# Patient Record
Sex: FEMALE | Race: WHITE | NOT HISPANIC OR LATINO | Employment: OTHER | URBAN - METROPOLITAN AREA
[De-identification: names, ages, dates, MRNs, and addresses within clinical notes are randomized per-mention and may not be internally consistent; named-entity substitution may affect disease eponyms.]

---

## 2019-01-01 ENCOUNTER — OFFICE VISIT (OUTPATIENT)
Dept: FAMILY MEDICINE CLINIC | Facility: CLINIC | Age: 84
End: 2019-01-01

## 2019-01-01 VITALS
HEART RATE: 61 BPM | HEIGHT: 65 IN | SYSTOLIC BLOOD PRESSURE: 135 MMHG | DIASTOLIC BLOOD PRESSURE: 85 MMHG | OXYGEN SATURATION: 97 % | TEMPERATURE: 97.4 F | WEIGHT: 141.8 LBS | BODY MASS INDEX: 23.63 KG/M2

## 2019-01-01 VITALS
WEIGHT: 142 LBS | DIASTOLIC BLOOD PRESSURE: 68 MMHG | HEART RATE: 68 BPM | OXYGEN SATURATION: 98 % | TEMPERATURE: 98.2 F | HEIGHT: 66 IN | BODY MASS INDEX: 22.82 KG/M2 | SYSTOLIC BLOOD PRESSURE: 126 MMHG

## 2019-01-01 DIAGNOSIS — F07.81 POST CONCUSSIVE SYNDROME: Primary | ICD-10-CM

## 2019-01-01 DIAGNOSIS — I10 ESSENTIAL HYPERTENSION: ICD-10-CM

## 2019-01-01 DIAGNOSIS — K21.9 GASTROESOPHAGEAL REFLUX DISEASE WITHOUT ESOPHAGITIS: ICD-10-CM

## 2019-01-01 DIAGNOSIS — E78.2 MIXED HYPERLIPIDEMIA: ICD-10-CM

## 2019-01-01 DIAGNOSIS — M54.17 LUMBOSACRAL RADICULOPATHY: ICD-10-CM

## 2019-01-01 DIAGNOSIS — R26.2 DIFFICULTY WALKING: ICD-10-CM

## 2019-01-01 DIAGNOSIS — I10 ESSENTIAL HYPERTENSION: Primary | ICD-10-CM

## 2019-01-01 DIAGNOSIS — R41.3 MEMORY IMPAIRMENT: ICD-10-CM

## 2019-01-01 PROCEDURE — 99214 OFFICE O/P EST MOD 30 MIN: CPT | Performed by: FAMILY MEDICINE

## 2019-01-01 RX ORDER — AMLODIPINE BESYLATE 2.5 MG/1
2.5 TABLET ORAL DAILY
COMMUNITY
End: 2019-01-01 | Stop reason: SDUPTHER

## 2019-01-01 RX ORDER — ATORVASTATIN CALCIUM 80 MG/1
80 TABLET, FILM COATED ORAL DAILY
Qty: 90 TABLET | Refills: 3 | Status: SHIPPED | OUTPATIENT
Start: 2019-01-01

## 2019-01-01 RX ORDER — ISOSORBIDE MONONITRATE 60 MG/1
60 TABLET, EXTENDED RELEASE ORAL DAILY
Qty: 90 TABLET | Refills: 3 | Status: SHIPPED | OUTPATIENT
Start: 2019-01-01 | End: 2020-01-01 | Stop reason: SDUPTHER

## 2019-01-01 RX ORDER — MIRTAZAPINE 7.5 MG/1
7.5 TABLET, FILM COATED ORAL
Qty: 90 TABLET | Refills: 3 | Status: SHIPPED | OUTPATIENT
Start: 2019-01-01 | End: 2020-01-01 | Stop reason: SDUPTHER

## 2019-01-01 RX ORDER — ESCITALOPRAM OXALATE 10 MG/1
15 TABLET ORAL DAILY
Qty: 135 TABLET | Refills: 2 | Status: SHIPPED | OUTPATIENT
Start: 2019-01-01 | End: 2020-01-01 | Stop reason: SDUPTHER

## 2019-01-01 RX ORDER — TICAGRELOR 90 MG/1
90 TABLET ORAL 2 TIMES DAILY
Qty: 180 TABLET | Refills: 1 | Status: SHIPPED | OUTPATIENT
Start: 2019-01-01 | End: 2020-01-01

## 2019-01-01 RX ORDER — AMLODIPINE BESYLATE 2.5 MG/1
2.5 TABLET ORAL DAILY
Qty: 90 TABLET | Refills: 3 | Status: SHIPPED | OUTPATIENT
Start: 2019-01-01

## 2019-01-01 RX ORDER — NYSTATIN 100000 U/G
OINTMENT TOPICAL
COMMUNITY

## 2019-01-01 RX ORDER — LOSARTAN POTASSIUM 25 MG/1
25 TABLET ORAL 2 TIMES DAILY
Qty: 180 TABLET | Refills: 3 | Status: SHIPPED | OUTPATIENT
Start: 2019-01-01 | End: 2020-01-01 | Stop reason: SDUPTHER

## 2019-01-01 RX ORDER — ESCITALOPRAM OXALATE 10 MG/1
15 TABLET ORAL DAILY
Qty: 45 TABLET | Refills: 4 | Status: SHIPPED | OUTPATIENT
Start: 2019-01-01 | End: 2019-01-01 | Stop reason: SDUPTHER

## 2019-01-01 RX ORDER — ZOLPIDEM TARTRATE 10 MG/1
TABLET ORAL
COMMUNITY
End: 2020-01-01 | Stop reason: SDUPTHER

## 2019-01-01 RX ORDER — ISOSORBIDE MONONITRATE 60 MG/1
60 TABLET, EXTENDED RELEASE ORAL DAILY
Refills: 0 | COMMUNITY
Start: 2019-08-02 | End: 2019-01-01 | Stop reason: SDUPTHER

## 2019-01-01 RX ORDER — MIRTAZAPINE 7.5 MG/1
7.5 TABLET, FILM COATED ORAL
Refills: 5 | COMMUNITY
Start: 2019-01-01 | End: 2019-01-01 | Stop reason: SDUPTHER

## 2019-01-01 RX ORDER — CARVEDILOL 3.12 MG/1
3.12 TABLET ORAL 2 TIMES DAILY
Qty: 180 TABLET | Refills: 3 | Status: SHIPPED | OUTPATIENT
Start: 2019-01-01

## 2019-08-01 ENCOUNTER — OFFICE VISIT (OUTPATIENT)
Dept: FAMILY MEDICINE CLINIC | Facility: CLINIC | Age: 84
End: 2019-08-01

## 2019-08-01 VITALS
WEIGHT: 139 LBS | SYSTOLIC BLOOD PRESSURE: 179 MMHG | OXYGEN SATURATION: 96 % | BODY MASS INDEX: 22.34 KG/M2 | TEMPERATURE: 98.2 F | DIASTOLIC BLOOD PRESSURE: 71 MMHG | HEART RATE: 67 BPM | HEIGHT: 66 IN

## 2019-08-01 DIAGNOSIS — I25.118 CORONARY ARTERY DISEASE INVOLVING NATIVE HEART WITH OTHER FORM OF ANGINA PECTORIS, UNSPECIFIED VESSEL OR LESION TYPE (HCC): ICD-10-CM

## 2019-08-01 DIAGNOSIS — S01.01XA LACERATION OF SCALP, INITIAL ENCOUNTER: ICD-10-CM

## 2019-08-01 DIAGNOSIS — R53.83 FATIGUE, UNSPECIFIED TYPE: Primary | ICD-10-CM

## 2019-08-01 DIAGNOSIS — H60.542 ECZEMA OF LEFT EXTERNAL EAR: ICD-10-CM

## 2019-08-01 PROBLEM — M48.061 SPINAL STENOSIS OF LUMBAR REGION: Status: ACTIVE | Noted: 2019-08-01

## 2019-08-01 PROBLEM — R00.2 PALPITATIONS: Status: ACTIVE | Noted: 2019-08-01

## 2019-08-01 PROBLEM — E53.8 VITAMIN B 12 DEFICIENCY: Status: ACTIVE | Noted: 2019-08-01

## 2019-08-01 PROBLEM — K21.9 GERD (GASTROESOPHAGEAL REFLUX DISEASE): Status: ACTIVE | Noted: 2019-08-01

## 2019-08-01 PROBLEM — G47.00 INSOMNIA: Status: ACTIVE | Noted: 2019-08-01

## 2019-08-01 PROBLEM — I82.409 DVT (DEEP VENOUS THROMBOSIS) (HCC): Status: ACTIVE | Noted: 2019-08-01

## 2019-08-01 PROBLEM — I25.10 CAD (CORONARY ARTERY DISEASE): Status: ACTIVE | Noted: 2019-08-01

## 2019-08-01 PROBLEM — R41.0 CONFUSION: Status: ACTIVE | Noted: 2019-08-01

## 2019-08-01 PROBLEM — I10 HTN (HYPERTENSION): Status: ACTIVE | Noted: 2019-08-01

## 2019-08-01 PROBLEM — R06.02 SOB (SHORTNESS OF BREATH) ON EXERTION: Status: ACTIVE | Noted: 2019-08-01

## 2019-08-01 PROBLEM — I20.9 ANGINA PECTORIS (HCC): Status: ACTIVE | Noted: 2019-08-01

## 2019-08-01 PROCEDURE — 99214 OFFICE O/P EST MOD 30 MIN: CPT | Performed by: FAMILY MEDICINE

## 2019-08-01 RX ORDER — NITROGLYCERIN 0.4 MG/1
0.4 TABLET SUBLINGUAL
COMMUNITY

## 2019-08-01 RX ORDER — ESCITALOPRAM OXALATE 10 MG/1
10 TABLET ORAL DAILY
COMMUNITY
Start: 2019-05-16 | End: 2019-01-01 | Stop reason: SDUPTHER

## 2019-08-01 RX ORDER — CARVEDILOL 3.12 MG/1
3.12 TABLET ORAL 2 TIMES DAILY
Refills: 5 | COMMUNITY
Start: 2019-06-14 | End: 2019-01-01 | Stop reason: SDUPTHER

## 2019-08-01 RX ORDER — TICAGRELOR 90 MG/1
90 TABLET ORAL 2 TIMES DAILY
Refills: 0 | COMMUNITY
Start: 2019-05-24 | End: 2019-01-01 | Stop reason: SDUPTHER

## 2019-08-01 RX ORDER — METOPROLOL SUCCINATE 25 MG/1
25 TABLET, EXTENDED RELEASE ORAL DAILY
COMMUNITY
End: 2019-01-01

## 2019-08-01 RX ORDER — ATORVASTATIN CALCIUM 80 MG/1
80 TABLET, FILM COATED ORAL DAILY
COMMUNITY
End: 2019-01-01 | Stop reason: SDUPTHER

## 2019-08-01 RX ORDER — ZOLPIDEM TARTRATE 10 MG/1
TABLET ORAL
Refills: 4 | COMMUNITY
Start: 2019-05-10 | End: 2019-01-01

## 2019-08-01 RX ORDER — LOSARTAN POTASSIUM 25 MG/1
25 TABLET ORAL 2 TIMES DAILY
Refills: 3 | COMMUNITY
Start: 2019-06-14 | End: 2019-01-01 | Stop reason: SDUPTHER

## 2019-08-01 RX ORDER — CYANOCOBALAMIN 1000 UG/ML
INJECTION, SOLUTION INTRAMUSCULAR; SUBCUTANEOUS
Refills: 3 | COMMUNITY
Start: 2019-05-06 | End: 2020-01-01 | Stop reason: SDUPTHER

## 2019-08-01 RX ORDER — RANOLAZINE 500 MG/1
500 TABLET, EXTENDED RELEASE ORAL 2 TIMES DAILY
COMMUNITY
End: 2020-01-01

## 2019-08-01 RX ORDER — OMEPRAZOLE 40 MG/1
CAPSULE, DELAYED RELEASE ORAL
COMMUNITY
Start: 2019-07-27 | End: 2020-01-01 | Stop reason: SDUPTHER

## 2019-08-01 NOTE — PROGRESS NOTES
Chief Complaint   Patient presents with   • Heartburn   • Hypertension   • Insomnia       Subjective   Kanchan Angela is a 83 y.o. female.     History of Present Illness       F/u fall and head lac after fall down stairs.  Positive LOC.   CT neg. Had staples put in 10 days ago.  Doing well.  Needs to have staples removed.    FU HTN.  No orthostasis.  Doing well with meds.    C/o trouble with L ear cracking.  No better.  No meds. Going on weeks.    C/o fatigue for weeks.  No better.  Sleeping well.       The following portions of the patient's history were reviewed and updated as appropriate: allergies, current medications, past family history, past medical history, past social history, past surgical history and problem list.    Review of Systems   Constitutional: Negative for appetite change and fatigue.   HENT: Negative for dental problem and tinnitus.    Eyes: Negative for blurred vision, double vision and visual disturbance.   Respiratory: Negative for cough, chest tightness and shortness of breath.    Cardiovascular: Negative for chest pain and leg swelling.   Gastrointestinal: Negative for blood in stool and vomiting.   Endocrine: Negative for cold intolerance and polyuria.   Musculoskeletal: Negative for arthralgias and myalgias.   Skin: Negative for rash.   Neurological: Negative for numbness and confusion.   Hematological: Does not bruise/bleed easily.   Psychiatric/Behavioral: Negative for agitation and depressed mood.       Patient Active Problem List   Diagnosis   • Angina pectoris (CMS/formerly [facility])   • CAD (coronary artery disease)   • Confusion   • DVT (deep venous thrombosis) (CMS/formerly [facility])   • Fatigue   • GERD (gastroesophageal reflux disease)   • HTN (hypertension)   • Palpitations   • Insomnia   • SOB (shortness of breath) on exertion   • Spinal stenosis of lumbar region   • Vitamin B 12 deficiency   • Scalp laceration   • Eczema of left external ear       No Known Allergies      Current Outpatient Medications:    •  atorvastatin (LIPITOR) 80 MG tablet, Take 80 mg by mouth Daily., Disp: , Rfl:   •  BRILINTA 90 MG tablet tablet, Take 90 mg by mouth 2 (Two) Times a Day. as directed, Disp: , Rfl: 0  •  carvedilol (COREG) 3.125 MG tablet, Take 3.125 mg by mouth 2 (Two) Times a Day., Disp: , Rfl: 5  •  cyanocobalamin 1000 MCG/ML injection, , Disp: , Rfl: 3  •  escitalopram (LEXAPRO) 10 MG tablet, , Disp: , Rfl:   •  losartan (COZAAR) 25 MG tablet, Take 25 mg by mouth 2 (Two) Times a Day., Disp: , Rfl: 3  •  metoprolol succinate XL (TOPROL-XL) 25 MG 24 hr tablet, Take 25 mg by mouth Daily., Disp: , Rfl:   •  nitroglycerin (NITROSTAT) 0.4 MG SL tablet, Place 0.4 mg under the tongue Every 5 (Five) Minutes As Needed for Chest Pain. Take no more than 3 doses in 15 minutes., Disp: , Rfl:   •  omeprazole (priLOSEC) 40 MG capsule, , Disp: , Rfl:   •  zolpidem (AMBIEN) 10 MG tablet, , Disp: , Rfl: 4  •  ranolazine (RANEXA) 500 MG 12 hr tablet, Take 500 mg by mouth 2 (Two) Times a Day., Disp: , Rfl:     Past Medical History:   Diagnosis Date   • Angina pectoris (CMS/[facility]) 8/1/2019   • CAD (coronary artery disease) 8/1/2019   • Confusion 8/1/2019   • DVT (deep venous thrombosis) (CMS/[facility]) 8/1/2019   • Fatigue 8/1/2019   • GERD (gastroesophageal reflux disease) 8/1/2019   • HTN (hypertension) 8/1/2019   • Insomnia 8/1/2019   • Palpitations 8/1/2019   • SOB (shortness of breath) on exertion 8/1/2019   • Spinal stenosis of lumbar region 8/1/2019   • Vitamin B 12 deficiency 8/1/2019       Past Surgical History:   Procedure Laterality Date   • CATARACT EXTRACTION     • HYSTERECTOMY     • SHOULDER ROTATOR CUFF REPAIR         Family History   Problem Relation Age of Onset   • Cardiomyopathy Mother    • Heart attack Mother    • Cardiomyopathy Father    • Heart attack Father    • Cardiomyopathy Son        Social History     Tobacco Use   • Smoking status: Never Smoker   • Smokeless tobacco: Never Used   Substance Use Topics   • Alcohol use: No      Frequency: Never            Objective     Vitals:    08/01/19 1131   BP: 179/71   Pulse: 67   Temp: 98.2 °F (36.8 °C)   SpO2: 96%     Body mass index is 22.44 kg/m².    Physical Exam   Constitutional: She appears well-developed and well-nourished.   HENT:   Head: Normocephalic and atraumatic.   Mouth/Throat: Oropharynx is clear and moist.   Eyes: Pupils are equal, round, and reactive to light. No scleral icterus.   Neck: No thyromegaly present.   Cardiovascular: Normal rate and regular rhythm. Exam reveals no gallop and no friction rub.   No murmur heard.  Pulmonary/Chest: Effort normal. No respiratory distress. She has no wheezes. She has no rales. She exhibits no tenderness.   Abdominal: Soft. Bowel sounds are normal. She exhibits no distension. There is no tenderness.   Musculoskeletal: Normal range of motion. She exhibits no edema or deformity.   Lymphadenopathy:     She has no cervical adenopathy.   Neurological: No cranial nerve deficit. She exhibits normal muscle tone.   Skin: Skin is warm and dry. No rash noted. She is not diaphoretic.   Well healed lac on post head with 4 staples   Vitals reviewed.      No results found for: GLUCOSE, BUN, CREATININE, EGFRIFNONA, EGFRIFAFRI, BCR, K, CO2, CALCIUM, PROTENTOTREF, ALBUMIN, LABIL2, AST, ALT    No results found for: WBC, RBC, HGB, HCT, MCV, MCH, MCHC, RDW, RDWSD, MPV, PLT, NEUTRORELPCT, LYMPHORELPCT, MONORELPCT, EOSRELPCT, BASORELPCT, AUTOIGPER, NEUTROABS, LYMPHSABS, MONOSABS, EOSABS, BASOSABS, AUTOIGNUM, NRBC    No results found for: HGBA1C    No results found for: [id number]    No results found for: TSH    No results found for: CHOL  No results found for: TRIG  No results found for: HDL  No results found for: LDL  No results found for: VLDL  No results found for: LDLHDL      Procedures  4 sutures removed with staple remover.  No comp.  Pt tolerated well.    Assessment/Plan   Problems Addressed this Visit        Cardiovascular and Mediastinum    CAD (coronary  artery disease)    Relevant Medications    metoprolol succinate XL (TOPROL-XL) 25 MG 24 hr tablet    Other Relevant Orders    Lipid Panel With / Chol / HDL Ratio       Nervous and Auditory    Eczema of left external ear       Other    Fatigue - Primary    Relevant Orders    CBC & Differential    Comprehensive Metabolic Panel    Vitamin B12    TSH Rfx On Abnormal To Free T4    Scalp laceration          Orders Placed This Encounter   Procedures   • Comprehensive Metabolic Panel   • Vitamin B12   • TSH Rfx On Abnormal To Free T4   • Lipid Panel With / Chol / HDL Ratio   • CBC & Differential     Order Specific Question:   Manual Differential     Answer:   No       Current Outpatient Medications   Medication Sig Dispense Refill   • atorvastatin (LIPITOR) 80 MG tablet Take 80 mg by mouth Daily.     • BRILINTA 90 MG tablet tablet Take 90 mg by mouth 2 (Two) Times a Day. as directed  0   • carvedilol (COREG) 3.125 MG tablet Take 3.125 mg by mouth 2 (Two) Times a Day.  5   • cyanocobalamin 1000 MCG/ML injection   3   • escitalopram (LEXAPRO) 10 MG tablet      • losartan (COZAAR) 25 MG tablet Take 25 mg by mouth 2 (Two) Times a Day.  3   • metoprolol succinate XL (TOPROL-XL) 25 MG 24 hr tablet Take 25 mg by mouth Daily.     • nitroglycerin (NITROSTAT) 0.4 MG SL tablet Place 0.4 mg under the tongue Every 5 (Five) Minutes As Needed for Chest Pain. Take no more than 3 doses in 15 minutes.     • omeprazole (priLOSEC) 40 MG capsule      • zolpidem (AMBIEN) 10 MG tablet   4   • ranolazine (RANEXA) 500 MG 12 hr tablet Take 500 mg by mouth 2 (Two) Times a Day.       No current facility-administered medications for this visit.        Kanchan Angela had no medications administered during this visit.    Return in about 2 months (around 10/1/2019).    There are no Patient Instructions on file for this visit.  Check ambulatory readings.

## 2019-08-02 LAB
ALBUMIN SERPL-MCNC: 4.8 G/DL (ref 3.5–5.2)
ALBUMIN/GLOB SERPL: 1.8 G/DL
ALP SERPL-CCNC: 117 U/L (ref 39–117)
ALT SERPL-CCNC: 18 U/L (ref 1–33)
AST SERPL-CCNC: 26 U/L (ref 1–32)
BASOPHILS # BLD AUTO: 0.03 10*3/MM3 (ref 0–0.2)
BASOPHILS NFR BLD AUTO: 0.6 % (ref 0–1.5)
BILIRUB SERPL-MCNC: 0.8 MG/DL (ref 0.2–1.2)
BUN SERPL-MCNC: 14 MG/DL (ref 8–23)
BUN/CREAT SERPL: 14.1 (ref 7–25)
CALCIUM SERPL-MCNC: 10 MG/DL (ref 8.6–10.5)
CHLORIDE SERPL-SCNC: 105 MMOL/L (ref 98–107)
CHOLEST SERPL-MCNC: 191 MG/DL (ref 0–200)
CHOLEST/HDLC SERPL: 3.41 {RATIO}
CO2 SERPL-SCNC: 27.9 MMOL/L (ref 22–29)
CREAT SERPL-MCNC: 0.99 MG/DL (ref 0.57–1)
EOSINOPHIL # BLD AUTO: 0.11 10*3/MM3 (ref 0–0.4)
EOSINOPHIL NFR BLD AUTO: 2.1 % (ref 0.3–6.2)
ERYTHROCYTE [DISTWIDTH] IN BLOOD BY AUTOMATED COUNT: 13.4 % (ref 12.3–15.4)
GLOBULIN SER CALC-MCNC: 2.6 GM/DL
GLUCOSE SERPL-MCNC: 97 MG/DL (ref 65–99)
HCT VFR BLD AUTO: 40.3 % (ref 34–46.6)
HDLC SERPL-MCNC: 56 MG/DL (ref 40–60)
HGB BLD-MCNC: 12.7 G/DL (ref 12–15.9)
IMM GRANULOCYTES # BLD AUTO: 0.01 10*3/MM3 (ref 0–0.05)
IMM GRANULOCYTES NFR BLD AUTO: 0.2 % (ref 0–0.5)
LDLC SERPL CALC-MCNC: 107 MG/DL (ref 0–100)
LYMPHOCYTES # BLD AUTO: 1.09 10*3/MM3 (ref 0.7–3.1)
LYMPHOCYTES NFR BLD AUTO: 21 % (ref 19.6–45.3)
MCH RBC QN AUTO: 30 PG (ref 26.6–33)
MCHC RBC AUTO-ENTMCNC: 31.5 G/DL (ref 31.5–35.7)
MCV RBC AUTO: 95 FL (ref 79–97)
MONOCYTES # BLD AUTO: 0.61 10*3/MM3 (ref 0.1–0.9)
MONOCYTES NFR BLD AUTO: 11.7 % (ref 5–12)
NEUTROPHILS # BLD AUTO: 3.35 10*3/MM3 (ref 1.7–7)
NEUTROPHILS NFR BLD AUTO: 64.4 % (ref 42.7–76)
NRBC BLD AUTO-RTO: 0 /100 WBC (ref 0–0.2)
PLATELET # BLD AUTO: 217 10*3/MM3 (ref 140–450)
POTASSIUM SERPL-SCNC: 4.9 MMOL/L (ref 3.5–5.2)
PROT SERPL-MCNC: 7.4 G/DL (ref 6–8.5)
RBC # BLD AUTO: 4.24 10*6/MM3 (ref 3.77–5.28)
SODIUM SERPL-SCNC: 145 MMOL/L (ref 136–145)
TRIGL SERPL-MCNC: 141 MG/DL (ref 0–150)
TSH SERPL DL<=0.005 MIU/L-ACNC: 1.22 MIU/ML (ref 0.27–4.2)
VIT B12 SERPL-MCNC: >2000 PG/ML (ref 211–946)
VLDLC SERPL CALC-MCNC: 28.2 MG/DL
WBC # BLD AUTO: 5.2 10*3/MM3 (ref 3.4–10.8)

## 2019-10-02 PROBLEM — F07.81 POST CONCUSSIVE SYNDROME: Status: ACTIVE | Noted: 2019-01-01

## 2019-10-02 PROBLEM — R26.2 DIFFICULTY WALKING: Status: ACTIVE | Noted: 2019-01-01

## 2019-10-02 NOTE — PROGRESS NOTES
Chief Complaint   Patient presents with   • Med Management   • Hypertension   • Memory Loss       Subjective   Kanchan Angela is a 84 y.o. female.     History of Present Illness   FU HTN.  No orthostasis.  Doing well with meds.    C/o trouble with balance and memory since the fall and closed head injury 7/22/19. Trouble with low mood as well.  Increased anxiety as well.     F/u ELEUTERIO. No reflux.  Doing well with meds.       The following portions of the patient's history were reviewed and updated as appropriate: allergies, current medications, past family history, past medical history, past social history, past surgical history and problem list.    Review of Systems   Constitutional: Negative for appetite change and fatigue.   HENT: Negative for nosebleeds and sore throat.    Eyes: Negative for blurred vision and visual disturbance.   Respiratory: Negative for shortness of breath and wheezing.    Cardiovascular: Negative for chest pain and leg swelling.   Gastrointestinal: Negative for abdominal distention and abdominal pain.   Endocrine: Negative for cold intolerance and polyuria.   Genitourinary: Negative for dysuria and hematuria.   Musculoskeletal: Negative for arthralgias and myalgias.   Skin: Negative for color change and rash.   Neurological: Negative for weakness and confusion.   Psychiatric/Behavioral: Negative for agitation and depressed mood.       Patient Active Problem List   Diagnosis   • Angina pectoris (CMS/[facility])   • CAD (coronary artery disease)   • Confusion   • DVT (deep venous thrombosis) (CMS/HCC)   • Fatigue   • GERD (gastroesophageal reflux disease)   • HTN (hypertension)   • Palpitations   • Insomnia   • SOB (shortness of breath) on exertion   • Spinal stenosis of lumbar region   • Vitamin B 12 deficiency   • Scalp laceration   • Eczema of left external ear   • Allergic rhinitis   • Benign neoplasm of cerebral meninges (CMS/HCC)   • Peripheral vascular disease (CMS/HCC)   • Osteoarthritis   •  Memory impairment   • Lumbosacral radiculopathy   • Hyperlipidemia   • Difficulty walking   • Post concussive syndrome       Allergies   Allergen Reactions   • Ciprofloxacin Diarrhea   • Gabapentin Confusion   • Metronidazole Diarrhea         Current Outpatient Medications:   •  amLODIPine (NORVASC) 2.5 MG tablet, Take 2.5 mg by mouth Daily., Disp: , Rfl:   •  aspirin 81 MG tablet, aspirin 81 mg tablet,delayed release  1 tablet by mouth daily, Disp: , Rfl:   •  atorvastatin (LIPITOR) 80 MG tablet, Take 80 mg by mouth Daily., Disp: , Rfl:   •  BRILINTA 90 MG tablet tablet, Take 90 mg by mouth 2 (Two) Times a Day. as directed, Disp: , Rfl: 0  •  carvedilol (COREG) 3.125 MG tablet, Take 3.125 mg by mouth 2 (Two) Times a Day., Disp: , Rfl: 5  •  cyanocobalamin 1000 MCG/ML injection, , Disp: , Rfl: 3  •  escitalopram (LEXAPRO) 10 MG tablet, Take 1.5 tablets by mouth Daily., Disp: 45 tablet, Rfl: 4  •  isosorbide mononitrate (IMDUR) 60 MG 24 hr tablet, Take 60 mg by mouth Daily., Disp: , Rfl: 0  •  losartan (COZAAR) 25 MG tablet, Take 25 mg by mouth 2 (Two) Times a Day., Disp: , Rfl: 3  •  mirtazapine (REMERON) 7.5 MG tablet, Take 7.5 mg by mouth every night at bedtime., Disp: , Rfl: 5  •  nitroglycerin (NITROSTAT) 0.4 MG SL tablet, Place 0.4 mg under the tongue Every 5 (Five) Minutes As Needed for Chest Pain. Take no more than 3 doses in 15 minutes., Disp: , Rfl:   •  omeprazole (priLOSEC) 40 MG capsule, , Disp: , Rfl:   •  ranolazine (RANEXA) 500 MG 12 hr tablet, Take 500 mg by mouth 2 (Two) Times a Day., Disp: , Rfl:     Past Medical History:   Diagnosis Date   • Abnormal electrocardiogram    • Angina pectoris (CMS/[facility]) 8/1/2019   • CAD (coronary artery disease) 8/1/2019   • Chest pain    • Confusion 8/1/2019   • Cramping of feet    • DVT (deep venous thrombosis) (CMS/[facility]) 8/1/2019   • Dysuria    • Fatigue 8/1/2019   • GERD (gastroesophageal reflux disease) 8/1/2019   • High risk medication use    • HTN (hypertension)     • Immunization deficiency    • Insomnia 8/1/2019   • Knee pain    • Leg cramps    • Palpitations 8/1/2019   • Persistent insomnia    • Residual foreign body in soft tissue    • SOB (shortness of breath) on exertion    • Spinal stenosis of lumbar region 8/1/2019   • Vaginitis    • Vitamin B 12 deficiency 8/1/2019       Past Surgical History:   Procedure Laterality Date   • CATARACT EXTRACTION     • HYSTERECTOMY      Due to cancer   • NEUROPLASTY      Decompression Median Nerve at Carpal tunnel   • OTHER SURGICAL HISTORY      Rectocele Repair   • SHOULDER ROTATOR CUFF REPAIR     • SHOULDER SURGERY         Family History   Problem Relation Age of Onset   • Cardiomyopathy Mother    • Heart attack Mother    • Cardiomyopathy Father    • Heart attack Father    • Cardiomyopathy Son        Social History     Tobacco Use   • Smoking status: Never Smoker   • Smokeless tobacco: Never Used   Substance Use Topics   • Alcohol use: No     Frequency: Never            Objective     Vitals:    10/02/19 1425   BP: 126/68   Pulse: 68   Temp: 98.2 °F (36.8 °C)   SpO2: 98%     Body mass index is 22.93 kg/m².    Physical Exam   Constitutional: She is oriented to person, place, and time. She appears well-developed and well-nourished.   HENT:   Head: Normocephalic and atraumatic.   Right Ear: External ear normal.   Left Ear: External ear normal.   Nose: Nose normal.   Mouth/Throat: Oropharynx is clear and moist.   Eyes: Conjunctivae and EOM are normal. Pupils are equal, round, and reactive to light.   Neck: Normal range of motion. Neck supple. No tracheal deviation present. No thyromegaly present.   Cardiovascular: Normal rate, regular rhythm and normal heart sounds. Exam reveals no gallop and no friction rub.   No murmur heard.  Pulmonary/Chest: Effort normal and breath sounds normal. No respiratory distress. She exhibits no tenderness.   Abdominal: Soft. Bowel sounds are normal. She exhibits no distension. There is no tenderness.    Musculoskeletal: Normal range of motion. She exhibits no edema or tenderness.   Lymphadenopathy:     She has no cervical adenopathy.   Neurological: She is alert and oriented to person, place, and time. She displays normal reflexes. No cranial nerve deficit or sensory deficit. Coordination normal.   Skin: Skin is warm and dry.   Psychiatric: She has a normal mood and affect. Her behavior is normal. Judgment and thought content normal.   Nursing note and vitals reviewed.      Lab Results   Component Value Date    BUN 14 08/01/2019    CREATININE 0.99 08/01/2019    EGFRIFNONA 54 (L) 08/01/2019    EGFRIFAFRI 65 08/01/2019    BCR 14.1 08/01/2019    K 4.9 08/01/2019    CO2 27.9 08/01/2019    CALCIUM 10.0 08/01/2019    PROTENTOTREF 7.4 08/01/2019    ALBUMIN 4.80 08/01/2019    LABIL2 1.8 08/01/2019    AST 26 08/01/2019    ALT 18 08/01/2019       WBC   Date Value Ref Range Status   08/01/2019 5.20 3.40 - 10.80 10*3/mm3 Final     RBC   Date Value Ref Range Status   08/01/2019 4.24 3.77 - 5.28 10*6/mm3 Final     Hemoglobin   Date Value Ref Range Status   08/01/2019 12.7 12.0 - 15.9 g/dL Final     Hematocrit   Date Value Ref Range Status   08/01/2019 40.3 34.0 - 46.6 % Final     MCV   Date Value Ref Range Status   08/01/2019 95.0 79.0 - 97.0 fL Final     MCH   Date Value Ref Range Status   08/01/2019 30.0 26.6 - 33.0 pg Final     MCHC   Date Value Ref Range Status   08/01/2019 31.5 31.5 - 35.7 g/dL Final     RDW   Date Value Ref Range Status   08/01/2019 13.4 12.3 - 15.4 % Final     Platelets   Date Value Ref Range Status   08/01/2019 217 140 - 450 10*3/mm3 Final     Neutrophil Rel %   Date Value Ref Range Status   08/01/2019 64.4 42.7 - 76.0 % Final     Lymphocyte Rel %   Date Value Ref Range Status   08/01/2019 21.0 19.6 - 45.3 % Final     Monocyte Rel %   Date Value Ref Range Status   08/01/2019 11.7 5.0 - 12.0 % Final     Eosinophil Rel %   Date Value Ref Range Status   08/01/2019 2.1 0.3 - 6.2 % Final     Basophil Rel %    Date Value Ref Range Status   08/01/2019 0.6 0.0 - 1.5 % Final     Neutrophils Absolute   Date Value Ref Range Status   08/01/2019 3.35 1.70 - 7.00 10*3/mm3 Final     Lymphocytes Absolute   Date Value Ref Range Status   08/01/2019 1.09 0.70 - 3.10 10*3/mm3 Final     Monocytes Absolute   Date Value Ref Range Status   08/01/2019 0.61 0.10 - 0.90 10*3/mm3 Final     Eosinophils Absolute   Date Value Ref Range Status   08/01/2019 0.11 0.00 - 0.40 10*3/mm3 Final     Basophils Absolute   Date Value Ref Range Status   08/01/2019 0.03 0.00 - 0.20 10*3/mm3 Final     nRBC   Date Value Ref Range Status   08/01/2019 0.0 0.0 - 0.2 /100 WBC Final       No results found for: HGBA1C    Lab Results   Component Value Date    OGFNEJCC04 >2000 (H) 08/01/2019       TSH   Date Value Ref Range Status   08/01/2019 1.220 0.270 - 4.200 mIU/mL Final       No results found for: CHOL  Lab Results   Component Value Date    TRIG 141 08/01/2019     Lab Results   Component Value Date    HDL 56 08/01/2019     Lab Results   Component Value Date     (H) 08/01/2019     Lab Results   Component Value Date    VLDL 28.2 08/01/2019     No results found for: LDLHDL      Procedures    Assessment/Plan   Problems Addressed this Visit        Cardiovascular and Mediastinum    HTN (hypertension) - Primary    Relevant Medications    amLODIPine (NORVASC) 2.5 MG tablet       Digestive    GERD (gastroesophageal reflux disease)       Other    Memory impairment    Difficulty walking          No orders of the defined types were placed in this encounter.      Current Outpatient Medications   Medication Sig Dispense Refill   • amLODIPine (NORVASC) 2.5 MG tablet Take 2.5 mg by mouth Daily.     • aspirin 81 MG tablet aspirin 81 mg tablet,delayed release   1 tablet by mouth daily     • atorvastatin (LIPITOR) 80 MG tablet Take 80 mg by mouth Daily.     • BRILINTA 90 MG tablet tablet Take 90 mg by mouth 2 (Two) Times a Day. as directed  0   • carvedilol (COREG) 3.125 MG  tablet Take 3.125 mg by mouth 2 (Two) Times a Day.  5   • cyanocobalamin 1000 MCG/ML injection   3   • escitalopram (LEXAPRO) 10 MG tablet Take 1.5 tablets by mouth Daily. 45 tablet 4   • isosorbide mononitrate (IMDUR) 60 MG 24 hr tablet Take 60 mg by mouth Daily.  0   • losartan (COZAAR) 25 MG tablet Take 25 mg by mouth 2 (Two) Times a Day.  3   • mirtazapine (REMERON) 7.5 MG tablet Take 7.5 mg by mouth every night at bedtime.  5   • nitroglycerin (NITROSTAT) 0.4 MG SL tablet Place 0.4 mg under the tongue Every 5 (Five) Minutes As Needed for Chest Pain. Take no more than 3 doses in 15 minutes.     • omeprazole (priLOSEC) 40 MG capsule      • ranolazine (RANEXA) 500 MG 12 hr tablet Take 500 mg by mouth 2 (Two) Times a Day.       No current facility-administered medications for this visit.        Kanchan Angela had no medications administered during this visit.    Return in about 2 months (around 12/2/2019).    There are no Patient Instructions on file for this visit.  Start PT 2 times a week to help with gait.

## 2019-12-11 NOTE — PROGRESS NOTES
Chief Complaint   Patient presents with   • Hypertension   • Hyperlipidemia       Subjective   Kanchan Angela is a 84 y.o. female.     History of Present Illness   F?U HTN.  Doing well with meds.    F/U post-concussive syndrome/lumbar DDD.  Doing better with PT.  I am doing better with my home PT.    FU hyperlipidemia.  Doing well with meds.  No SE.       The following portions of the patient's history were reviewed and updated as appropriate: allergies, current medications, past family history, past medical history, past social history, past surgical history and problem list.    Review of Systems   Constitutional: Negative for appetite change and fatigue.   HENT: Negative for nosebleeds and sore throat.    Eyes: Negative for blurred vision and visual disturbance.   Respiratory: Negative for shortness of breath and wheezing.    Cardiovascular: Negative for chest pain and leg swelling.   Gastrointestinal: Negative for abdominal distention and abdominal pain.   Endocrine: Negative for cold intolerance and polyuria.   Genitourinary: Negative for dysuria and hematuria.   Musculoskeletal: Negative for arthralgias and myalgias.   Skin: Negative for color change and rash.   Neurological: Negative for weakness and confusion.   Psychiatric/Behavioral: Negative for agitation and depressed mood.       Patient Active Problem List   Diagnosis   • Angina pectoris (CMS/HCC)   • CAD (coronary artery disease)   • Confusion   • DVT (deep venous thrombosis) (CMS/HCC)   • Fatigue   • GERD (gastroesophageal reflux disease)   • HTN (hypertension)   • Palpitations   • Insomnia   • SOB (shortness of breath) on exertion   • Spinal stenosis of lumbar region   • Vitamin B 12 deficiency   • Scalp laceration   • Eczema of left external ear   • Allergic rhinitis   • Benign neoplasm of cerebral meninges (CMS/HCC)   • Peripheral vascular disease (CMS/HCC)   • Osteoarthritis   • Memory impairment   • Lumbosacral radiculopathy   • Hyperlipidemia   •  Difficulty walking   • Post concussive syndrome       Allergies   Allergen Reactions   • Ciprofloxacin Diarrhea   • Gabapentin Confusion   • Metronidazole Diarrhea         Current Outpatient Medications:   •  amLODIPine (NORVASC) 2.5 MG tablet, Take 1 tablet by mouth Daily., Disp: 90 tablet, Rfl: 3  •  aspirin 81 MG tablet, aspirin 81 mg tablet,delayed release  1 tablet by mouth daily, Disp: , Rfl:   •  atorvastatin (LIPITOR) 80 MG tablet, Take 1 tablet by mouth Daily., Disp: 90 tablet, Rfl: 3  •  BRILINTA 90 MG tablet tablet, Take 1 tablet by mouth 2 (Two) Times a Day. as directed, Disp: 180 tablet, Rfl: 1  •  carvedilol (COREG) 3.125 MG tablet, Take 1 tablet by mouth 2 (Two) Times a Day., Disp: 180 tablet, Rfl: 3  •  cyanocobalamin 1000 MCG/ML injection, , Disp: , Rfl: 3  •  escitalopram (LEXAPRO) 10 MG tablet, Take 1.5 tablets by mouth Daily., Disp: 135 tablet, Rfl: 2  •  isosorbide mononitrate (IMDUR) 60 MG 24 hr tablet, Take 1 tablet by mouth Daily., Disp: 90 tablet, Rfl: 3  •  losartan (COZAAR) 25 MG tablet, Take 1 tablet by mouth 2 (Two) Times a Day., Disp: 180 tablet, Rfl: 3  •  mirtazapine (REMERON) 7.5 MG tablet, Take 1 tablet by mouth every night at bedtime., Disp: 90 tablet, Rfl: 3  •  nitroglycerin (NITROSTAT) 0.4 MG SL tablet, Place 0.4 mg under the tongue Every 5 (Five) Minutes As Needed for Chest Pain. Take no more than 3 doses in 15 minutes., Disp: , Rfl:   •  nystatin (MYCOSTATIN) 302216 UNIT/GM ointment, nystatin 100,000 unit/gram topical ointment, Disp: , Rfl:   •  omeprazole (priLOSEC) 40 MG capsule, , Disp: , Rfl:   •  ranolazine (RANEXA) 500 MG 12 hr tablet, Take 500 mg by mouth 2 (Two) Times a Day., Disp: , Rfl:   •  zolpidem (AMBIEN) 10 MG tablet, zolpidem 10 mg tablet  TAKE 1 4 TO 1 2 (ONE FOURTH TO ONE HALF) TABLET BY MOUTH AT NIGHT AS NEEDED FOR INSOMNIA, Disp: , Rfl:     Past Medical History:   Diagnosis Date   • Abnormal electrocardiogram    • Angina pectoris (CMS/[facility]) 8/1/2019   • CAD  (coronary artery disease) 8/1/2019   • Chest pain    • Confusion 8/1/2019   • Cramping of feet    • DVT (deep venous thrombosis) (CMS/[facility]) 8/1/2019   • Dysuria    • Fatigue 8/1/2019   • GERD (gastroesophageal reflux disease) 8/1/2019   • High risk medication use    • HTN (hypertension)    • Immunization deficiency    • Insomnia 8/1/2019   • Knee pain    • Leg cramps    • Palpitations 8/1/2019   • Persistent insomnia    • Residual foreign body in soft tissue    • SOB (shortness of breath) on exertion    • Spinal stenosis of lumbar region 8/1/2019   • Vaginitis    • Vitamin B 12 deficiency 8/1/2019       Past Surgical History:   Procedure Laterality Date   • CATARACT EXTRACTION     • HYSTERECTOMY      Due to cancer   • NEUROPLASTY      Decompression Median Nerve at Carpal tunnel   • OTHER SURGICAL HISTORY      Rectocele Repair   • SHOULDER ROTATOR CUFF REPAIR     • SHOULDER SURGERY         Family History   Problem Relation Age of Onset   • Cardiomyopathy Mother    • Heart attack Mother    • Cardiomyopathy Father    • Heart attack Father    • Cardiomyopathy Son        Social History     Tobacco Use   • Smoking status: Never Smoker   • Smokeless tobacco: Never Used   Substance Use Topics   • Alcohol use: No     Frequency: Never            Objective     Vitals:    12/11/19 1432   BP: 135/85   Pulse: 61   Temp: 97.4 °F (36.3 °C)   SpO2: 97%     Body mass index is 23.6 kg/m².    Physical Exam   Constitutional: She is oriented to person, place, and time. She appears well-developed and well-nourished.   HENT:   Head: Normocephalic and atraumatic.   Right Ear: External ear normal.   Left Ear: External ear normal.   Nose: Nose normal.   Mouth/Throat: Oropharynx is clear and moist.   Eyes: Pupils are equal, round, and reactive to light. Conjunctivae and EOM are normal.   Neck: Normal range of motion. Neck supple. No tracheal deviation present. No thyromegaly present.   Cardiovascular: Normal rate, regular rhythm and normal  heart sounds. Exam reveals no gallop and no friction rub.   No murmur heard.  Pulmonary/Chest: Effort normal and breath sounds normal. No respiratory distress. She exhibits no tenderness.   Abdominal: Soft. Bowel sounds are normal. She exhibits no distension. There is no tenderness.   Musculoskeletal: Normal range of motion. She exhibits no edema or tenderness.   Lymphadenopathy:     She has no cervical adenopathy.   Neurological: She is alert and oriented to person, place, and time. She displays normal reflexes. No cranial nerve deficit or sensory deficit. Coordination normal.   Skin: Skin is warm and dry.   Psychiatric: She has a normal mood and affect. Her behavior is normal. Judgment and thought content normal.   Nursing note and vitals reviewed.      Lab Results   Component Value Date    BUN 14 08/01/2019    CREATININE 0.99 08/01/2019    EGFRIFNONA 54 (L) 08/01/2019    EGFRIFAFRI 65 08/01/2019    BCR 14.1 08/01/2019    K 4.9 08/01/2019    CO2 27.9 08/01/2019    CALCIUM 10.0 08/01/2019    PROTENTOTREF 7.4 08/01/2019    ALBUMIN 4.80 08/01/2019    LABIL2 1.8 08/01/2019    AST 26 08/01/2019    ALT 18 08/01/2019       WBC   Date Value Ref Range Status   08/01/2019 5.20 3.40 - 10.80 10*3/mm3 Final     RBC   Date Value Ref Range Status   08/01/2019 4.24 3.77 - 5.28 10*6/mm3 Final     Hemoglobin   Date Value Ref Range Status   08/01/2019 12.7 12.0 - 15.9 g/dL Final     Hematocrit   Date Value Ref Range Status   08/01/2019 40.3 34.0 - 46.6 % Final     MCV   Date Value Ref Range Status   08/01/2019 95.0 79.0 - 97.0 fL Final     MCH   Date Value Ref Range Status   08/01/2019 30.0 26.6 - 33.0 pg Final     MCHC   Date Value Ref Range Status   08/01/2019 31.5 31.5 - 35.7 g/dL Final     RDW   Date Value Ref Range Status   08/01/2019 13.4 12.3 - 15.4 % Final     Platelets   Date Value Ref Range Status   08/01/2019 217 140 - 450 10*3/mm3 Final     Neutrophil Rel %   Date Value Ref Range Status   08/01/2019 64.4 42.7 - 76.0 %  Final     Lymphocyte Rel %   Date Value Ref Range Status   08/01/2019 21.0 19.6 - 45.3 % Final     Monocyte Rel %   Date Value Ref Range Status   08/01/2019 11.7 5.0 - 12.0 % Final     Eosinophil Rel %   Date Value Ref Range Status   08/01/2019 2.1 0.3 - 6.2 % Final     Basophil Rel %   Date Value Ref Range Status   08/01/2019 0.6 0.0 - 1.5 % Final     Neutrophils Absolute   Date Value Ref Range Status   08/01/2019 3.35 1.70 - 7.00 10*3/mm3 Final     Lymphocytes Absolute   Date Value Ref Range Status   08/01/2019 1.09 0.70 - 3.10 10*3/mm3 Final     Monocytes Absolute   Date Value Ref Range Status   08/01/2019 0.61 0.10 - 0.90 10*3/mm3 Final     Eosinophils Absolute   Date Value Ref Range Status   08/01/2019 0.11 0.00 - 0.40 10*3/mm3 Final     Basophils Absolute   Date Value Ref Range Status   08/01/2019 0.03 0.00 - 0.20 10*3/mm3 Final     nRBC   Date Value Ref Range Status   08/01/2019 0.0 0.0 - 0.2 /100 WBC Final       No results found for: HGBA1C    Lab Results   Component Value Date    GQWLCYKG79 >2000 (H) 08/01/2019       TSH   Date Value Ref Range Status   08/01/2019 1.220 0.270 - 4.200 mIU/mL Final       No results found for: CHOL  Lab Results   Component Value Date    TRIG 141 08/01/2019     Lab Results   Component Value Date    HDL 56 08/01/2019     Lab Results   Component Value Date     (H) 08/01/2019     Lab Results   Component Value Date    VLDL 28.2 08/01/2019     No results found for: LDLHDL      Procedures    Assessment/Plan   Problems Addressed this Visit        Cardiovascular and Mediastinum    HTN (hypertension)    Relevant Medications    amLODIPine (NORVASC) 2.5 MG tablet    carvedilol (COREG) 3.125 MG tablet    losartan (COZAAR) 25 MG tablet    Hyperlipidemia    Relevant Medications    atorvastatin (LIPITOR) 80 MG tablet       Nervous and Auditory    Lumbosacral radiculopathy    Post concussive syndrome - Primary    Relevant Medications    zolpidem (AMBIEN) 10 MG tablet    escitalopram  (LEXAPRO) 10 MG tablet    mirtazapine (REMERON) 7.5 MG tablet        Rx as below.  Continue statin.  Continue home PT.   No orders of the defined types were placed in this encounter.      Current Outpatient Medications   Medication Sig Dispense Refill   • amLODIPine (NORVASC) 2.5 MG tablet Take 1 tablet by mouth Daily. 90 tablet 3   • aspirin 81 MG tablet aspirin 81 mg tablet,delayed release   1 tablet by mouth daily     • atorvastatin (LIPITOR) 80 MG tablet Take 1 tablet by mouth Daily. 90 tablet 3   • BRILINTA 90 MG tablet tablet Take 1 tablet by mouth 2 (Two) Times a Day. as directed 180 tablet 1   • carvedilol (COREG) 3.125 MG tablet Take 1 tablet by mouth 2 (Two) Times a Day. 180 tablet 3   • cyanocobalamin 1000 MCG/ML injection   3   • escitalopram (LEXAPRO) 10 MG tablet Take 1.5 tablets by mouth Daily. 135 tablet 2   • isosorbide mononitrate (IMDUR) 60 MG 24 hr tablet Take 1 tablet by mouth Daily. 90 tablet 3   • losartan (COZAAR) 25 MG tablet Take 1 tablet by mouth 2 (Two) Times a Day. 180 tablet 3   • mirtazapine (REMERON) 7.5 MG tablet Take 1 tablet by mouth every night at bedtime. 90 tablet 3   • nitroglycerin (NITROSTAT) 0.4 MG SL tablet Place 0.4 mg under the tongue Every 5 (Five) Minutes As Needed for Chest Pain. Take no more than 3 doses in 15 minutes.     • nystatin (MYCOSTATIN) 206226 UNIT/GM ointment nystatin 100,000 unit/gram topical ointment     • omeprazole (priLOSEC) 40 MG capsule      • ranolazine (RANEXA) 500 MG 12 hr tablet Take 500 mg by mouth 2 (Two) Times a Day.     • zolpidem (AMBIEN) 10 MG tablet zolpidem 10 mg tablet   TAKE 1 4 TO 1 2 (ONE FOURTH TO ONE HALF) TABLET BY MOUTH AT NIGHT AS NEEDED FOR INSOMNIA       No current facility-administered medications for this visit.        Kanchan Angela had no medications administered during this visit.    Return in about 2 months (around 2/11/2020).    There are no Patient Instructions on file for this visit.

## 2020-01-01 ENCOUNTER — OFFICE VISIT (OUTPATIENT)
Dept: FAMILY MEDICINE CLINIC | Facility: CLINIC | Age: 85
End: 2020-01-01

## 2020-01-01 ENCOUNTER — TELEPHONE (OUTPATIENT)
Dept: FAMILY MEDICINE CLINIC | Facility: CLINIC | Age: 85
End: 2020-01-01

## 2020-01-01 VITALS
BODY MASS INDEX: 23.99 KG/M2 | SYSTOLIC BLOOD PRESSURE: 106 MMHG | HEIGHT: 65 IN | OXYGEN SATURATION: 96 % | HEART RATE: 76 BPM | TEMPERATURE: 97.3 F | WEIGHT: 144 LBS | DIASTOLIC BLOOD PRESSURE: 63 MMHG

## 2020-01-01 VITALS
WEIGHT: 144 LBS | SYSTOLIC BLOOD PRESSURE: 100 MMHG | DIASTOLIC BLOOD PRESSURE: 70 MMHG | HEIGHT: 65 IN | RESPIRATION RATE: 12 BRPM | BODY MASS INDEX: 23.99 KG/M2

## 2020-01-01 VITALS
TEMPERATURE: 97.8 F | BODY MASS INDEX: 24.03 KG/M2 | DIASTOLIC BLOOD PRESSURE: 86 MMHG | HEIGHT: 65 IN | OXYGEN SATURATION: 97 % | SYSTOLIC BLOOD PRESSURE: 142 MMHG | HEART RATE: 76 BPM | WEIGHT: 144.2 LBS

## 2020-01-01 DIAGNOSIS — F32.4 MAJOR DEPRESSIVE DISORDER IN PARTIAL REMISSION, UNSPECIFIED WHETHER RECURRENT (HCC): Primary | ICD-10-CM

## 2020-01-01 DIAGNOSIS — I10 ESSENTIAL HYPERTENSION: ICD-10-CM

## 2020-01-01 DIAGNOSIS — M17.12 PRIMARY OSTEOARTHRITIS OF LEFT KNEE: Primary | ICD-10-CM

## 2020-01-01 DIAGNOSIS — R07.9 CHEST PAIN, UNSPECIFIED TYPE: ICD-10-CM

## 2020-01-01 DIAGNOSIS — I10 ESSENTIAL HYPERTENSION: Primary | ICD-10-CM

## 2020-01-01 DIAGNOSIS — M17.12 PRIMARY OSTEOARTHRITIS OF LEFT KNEE: ICD-10-CM

## 2020-01-01 DIAGNOSIS — E78.2 MIXED HYPERLIPIDEMIA: ICD-10-CM

## 2020-01-01 DIAGNOSIS — I20.9 ANGINA PECTORIS (HCC): ICD-10-CM

## 2020-01-01 DIAGNOSIS — F51.01 PRIMARY INSOMNIA: ICD-10-CM

## 2020-01-01 DIAGNOSIS — Z00.00 MEDICARE ANNUAL WELLNESS VISIT, SUBSEQUENT: ICD-10-CM

## 2020-01-01 DIAGNOSIS — F51.01 PRIMARY INSOMNIA: Primary | ICD-10-CM

## 2020-01-01 DIAGNOSIS — F32.4 MAJOR DEPRESSIVE DISORDER IN PARTIAL REMISSION, UNSPECIFIED WHETHER RECURRENT (HCC): ICD-10-CM

## 2020-01-01 LAB
ALBUMIN SERPL-MCNC: 4.3 G/DL (ref 3.6–4.6)
ALBUMIN/GLOB SERPL: 2.3 {RATIO} (ref 1.2–2.2)
ALP SERPL-CCNC: 123 IU/L (ref 39–117)
ALT SERPL-CCNC: 18 IU/L (ref 0–32)
AST SERPL-CCNC: 22 IU/L (ref 0–40)
BASOPHILS # BLD AUTO: 0 X10E3/UL (ref 0–0.2)
BASOPHILS NFR BLD AUTO: 0 %
BILIRUB SERPL-MCNC: 0.4 MG/DL (ref 0–1.2)
BUN SERPL-MCNC: 16 MG/DL (ref 8–27)
BUN/CREAT SERPL: 14 (ref 12–28)
CALCIUM SERPL-MCNC: 9 MG/DL (ref 8.7–10.3)
CHLORIDE SERPL-SCNC: 105 MMOL/L (ref 96–106)
CHOLEST SERPL-MCNC: 137 MG/DL (ref 100–199)
CHOLEST/HDLC SERPL: 3.7 RATIO (ref 0–4.4)
CO2 SERPL-SCNC: 22 MMOL/L (ref 20–29)
CREAT SERPL-MCNC: 1.18 MG/DL (ref 0.57–1)
EOSINOPHIL # BLD AUTO: 0.1 X10E3/UL (ref 0–0.4)
EOSINOPHIL NFR BLD AUTO: 2 %
ERYTHROCYTE [DISTWIDTH] IN BLOOD BY AUTOMATED COUNT: 13.4 % (ref 11.7–15.4)
GLOBULIN SER CALC-MCNC: 1.9 G/DL (ref 1.5–4.5)
GLUCOSE SERPL-MCNC: 118 MG/DL (ref 65–99)
HCT VFR BLD AUTO: 34.9 % (ref 34–46.6)
HDLC SERPL-MCNC: 37 MG/DL
HGB BLD-MCNC: 11.5 G/DL (ref 11.1–15.9)
IMM GRANULOCYTES # BLD AUTO: 0 X10E3/UL (ref 0–0.1)
IMM GRANULOCYTES NFR BLD AUTO: 0 %
LDLC SERPL CALC-MCNC: 41 MG/DL (ref 0–99)
LYMPHOCYTES # BLD AUTO: 1.1 X10E3/UL (ref 0.7–3.1)
LYMPHOCYTES NFR BLD AUTO: 20 %
MCH RBC QN AUTO: 30.4 PG (ref 26.6–33)
MCHC RBC AUTO-ENTMCNC: 33 G/DL (ref 31.5–35.7)
MCV RBC AUTO: 92 FL (ref 79–97)
MONOCYTES # BLD AUTO: 0.6 X10E3/UL (ref 0.1–0.9)
MONOCYTES NFR BLD AUTO: 11 %
NEUTROPHILS # BLD AUTO: 3.5 X10E3/UL (ref 1.4–7)
NEUTROPHILS NFR BLD AUTO: 67 %
PLATELET # BLD AUTO: 184 X10E3/UL (ref 150–450)
POTASSIUM SERPL-SCNC: 4.1 MMOL/L (ref 3.5–5.2)
PROT SERPL-MCNC: 6.2 G/DL (ref 6–8.5)
RBC # BLD AUTO: 3.78 X10E6/UL (ref 3.77–5.28)
SODIUM SERPL-SCNC: 143 MMOL/L (ref 134–144)
TRIGL SERPL-MCNC: 294 MG/DL (ref 0–149)
VLDLC SERPL CALC-MCNC: 59 MG/DL (ref 5–40)
WBC # BLD AUTO: 5.3 X10E3/UL (ref 3.4–10.8)

## 2020-01-01 PROCEDURE — 99214 OFFICE O/P EST MOD 30 MIN: CPT | Performed by: FAMILY MEDICINE

## 2020-01-01 PROCEDURE — G0439 PPPS, SUBSEQ VISIT: HCPCS | Performed by: FAMILY MEDICINE

## 2020-01-01 RX ORDER — MIRTAZAPINE 15 MG/1
TABLET, FILM COATED ORAL
Qty: 30 TABLET | Refills: 5 | Status: SHIPPED | OUTPATIENT
Start: 2020-01-01 | End: 2020-01-01 | Stop reason: SDUPTHER

## 2020-01-01 RX ORDER — ISOSORBIDE MONONITRATE 60 MG/1
60 TABLET, EXTENDED RELEASE ORAL DAILY
Qty: 90 TABLET | Refills: 3 | Status: SHIPPED | OUTPATIENT
Start: 2020-01-01

## 2020-01-01 RX ORDER — OMEPRAZOLE 40 MG/1
40 CAPSULE, DELAYED RELEASE ORAL DAILY
Qty: 30 CAPSULE | Refills: 4 | Status: SHIPPED | OUTPATIENT
Start: 2020-01-01

## 2020-01-01 RX ORDER — TICAGRELOR 90 MG/1
TABLET ORAL
Qty: 180 TABLET | Refills: 0 | Status: SHIPPED | OUTPATIENT
Start: 2020-01-01

## 2020-01-01 RX ORDER — MIRTAZAPINE 15 MG/1
TABLET, FILM COATED ORAL
Qty: 30 TABLET | Refills: 5 | Status: SHIPPED | OUTPATIENT
Start: 2020-01-01

## 2020-01-01 RX ORDER — CYANOCOBALAMIN 1000 UG/ML
1000 INJECTION, SOLUTION INTRAMUSCULAR; SUBCUTANEOUS
Qty: 3 ML | Refills: 3 | Status: SHIPPED | OUTPATIENT
Start: 2020-01-01

## 2020-01-01 RX ORDER — ESCITALOPRAM OXALATE 10 MG/1
TABLET ORAL
Qty: 135 TABLET | Refills: 2 | Status: SHIPPED | OUTPATIENT
Start: 2020-01-01

## 2020-01-01 RX ORDER — SYRINGE W-NEEDLE,DISPOSAB,3 ML 25GX5/8"
1 SYRINGE, EMPTY DISPOSABLE MISCELLANEOUS
Qty: 10 EACH | Refills: 2 | Status: SHIPPED | OUTPATIENT
Start: 2020-01-01

## 2020-01-01 RX ORDER — LOSARTAN POTASSIUM 25 MG/1
25 TABLET ORAL 2 TIMES DAILY
Qty: 180 TABLET | Refills: 3 | Status: SHIPPED | OUTPATIENT
Start: 2020-01-01

## 2020-01-01 RX ORDER — ZOLPIDEM TARTRATE 10 MG/1
TABLET ORAL
Qty: 20 TABLET | Refills: 3 | Status: SHIPPED | OUTPATIENT
Start: 2020-01-01

## 2020-02-12 PROBLEM — Z00.00 MEDICARE ANNUAL WELLNESS VISIT, SUBSEQUENT: Status: ACTIVE | Noted: 2020-01-01

## 2020-02-12 PROBLEM — F32.A DEPRESSION: Status: ACTIVE | Noted: 2020-01-01

## 2020-02-12 NOTE — PROGRESS NOTES
The ABCs of the Annual Wellness Visit  Subsequent Medicare Wellness Visit    Chief Complaint   Patient presents with   • Hypertension   • Hyperlipidemia   • Insomnia   • med refills       Subjective   History of Present Illness:  Kanchan Angela is a 84 y.o. female who presents for a Subsequent Medicare Wellness Visit.  F/U insomnia.  Doing wlel with meds.  NO Se.  Needs refill on ambien.  I use 1/2 at night prn.   F/U HTN.  No orthostasis.  On meds regulalry,.   F/U hyperlipidemia.  No myalgias.    8/19.    C/o trouble with depression.  Trouble for months.  Worse last few weeks.  Crying at times.  Lot of change lately.    HEALTH RISK ASSESSMENT    Recent Hospitalizations:  No hospitalization(s) within the last year.    Current Medical Providers:  Patient Care Team:  Joel Jj MD as PCP - General (Family Medicine)    Smoking Status:  Social History     Tobacco Use   Smoking Status Never Smoker   Smokeless Tobacco Never Used       Alcohol Consumption:  Social History     Substance and Sexual Activity   Alcohol Use No   • Frequency: Never       Depression Screen:   PHQ-2/PHQ-9 Depression Screening 2/12/2020   Little interest or pleasure in doing things 0   Feeling down, depressed, or hopeless 0   Total Score 0       Fall Risk Screen:  STEADI Fall Risk Assessment has not been completed.    Health Habits and Functional and Cognitive Screening:  Functional & Cognitive Status 2/12/2020   Do you have difficulty preparing food and eating? No   Do you have difficulty bathing yourself, getting dressed or grooming yourself? No   Do you have difficulty using the toilet? No   Do you have difficulty moving around from place to place? No   Do you have trouble with steps or getting out of a bed or a chair? No   Current Diet Well Balanced Diet   Dental Exam Not up to date   Eye Exam Up to date   Exercise (times per week) 0 times per week   Current Exercise Activities Include None   Do you need help using the phone?   No   Are you deaf or do you have serious difficulty hearing?  No   Do you need help with transportation? No   Do you need help shopping? No   Do you need help preparing meals?  No   Do you need help with housework?  No   Do you need help with laundry? No   Do you need help taking your medications? No   Do you need help managing money? No   Do you ever drive or ride in a car without wearing a seat belt? No   Have you felt unusual stress, anger or loneliness in the last month? No   Who do you live with? Spouse   If you need help, do you have trouble finding someone available to you? No   Have you been bothered in the last four weeks by sexual problems? No   Do you have difficulty concentrating, remembering or making decisions? No         Does the patient have evidence of cognitive impairment? No    Asprin use counseling:Taking ASA appropriately as indicated    Age-appropriate Screening Schedule:  Refer to the list below for future screening recommendations based on patient's age, sex and/or medical conditions. Orders for these recommended tests are listed in the plan section. The patient has been provided with a written plan.    Health Maintenance   Topic Date Due   • TDAP/TD VACCINES (1 - Tdap) 08/03/1946   • ZOSTER VACCINE (1 of 2) 08/03/1986   • LIPID PANEL  08/01/2020   • INFLUENZA VACCINE  Completed          The following portions of the patient's history were reviewed and updated as appropriate: allergies, current medications, past family history, past medical history, past social history, past surgical history and problem list.    Outpatient Medications Prior to Visit   Medication Sig Dispense Refill   • amLODIPine (NORVASC) 2.5 MG tablet Take 1 tablet by mouth Daily. 90 tablet 3   • aspirin 81 MG tablet aspirin 81 mg tablet,delayed release   1 tablet by mouth daily     • atorvastatin (LIPITOR) 80 MG tablet Take 1 tablet by mouth Daily. 90 tablet 3   • BRILINTA 90 MG tablet tablet Take 1 tablet by mouth 2 (Two)  Times a Day. as directed 180 tablet 1   • carvedilol (COREG) 3.125 MG tablet Take 1 tablet by mouth 2 (Two) Times a Day. 180 tablet 3   • escitalopram (LEXAPRO) 10 MG tablet Take 1.5 tablets by mouth Daily. 135 tablet 2   • losartan (COZAAR) 25 MG tablet Take 1 tablet by mouth 2 (Two) Times a Day. 180 tablet 3   • nitroglycerin (NITROSTAT) 0.4 MG SL tablet Place 0.4 mg under the tongue Every 5 (Five) Minutes As Needed for Chest Pain. Take no more than 3 doses in 15 minutes.     • nystatin (MYCOSTATIN) 071906 UNIT/GM ointment nystatin 100,000 unit/gram topical ointment     • omeprazole (priLOSEC) 40 MG capsule      • cyanocobalamin 1000 MCG/ML injection   3   • isosorbide mononitrate (IMDUR) 60 MG 24 hr tablet Take 1 tablet by mouth Daily. 90 tablet 3   • mirtazapine (REMERON) 7.5 MG tablet Take 1 tablet by mouth every night at bedtime. 90 tablet 3   • ranolazine (RANEXA) 500 MG 12 hr tablet Take 500 mg by mouth 2 (Two) Times a Day.     • zolpidem (AMBIEN) 10 MG tablet zolpidem 10 mg tablet   TAKE 1 4 TO 1 2 (ONE FOURTH TO ONE HALF) TABLET BY MOUTH AT NIGHT AS NEEDED FOR INSOMNIA       No facility-administered medications prior to visit.        Patient Active Problem List   Diagnosis   • Angina pectoris (CMS/HCC)   • CAD (coronary artery disease)   • Confusion   • DVT (deep venous thrombosis) (CMS/[facility])   • Fatigue   • GERD (gastroesophageal reflux disease)   • HTN (hypertension)   • Palpitations   • Insomnia   • SOB (shortness of breath) on exertion   • Spinal stenosis of lumbar region   • Vitamin B 12 deficiency   • Scalp laceration   • Eczema of left external ear   • Allergic rhinitis   • Benign neoplasm of cerebral meninges (CMS/HCC)   • Peripheral vascular disease (CMS/HCC)   • Osteoarthritis   • Memory impairment   • Lumbosacral radiculopathy   • Hyperlipidemia   • Difficulty walking   • Post concussive syndrome   • Medicare annual wellness visit, subsequent   • Depression       Advanced Care Planning:  ACP  "discussion was held with the patient during this visit. Patient has an advance directive, copy requested.    Review of Systems   Constitutional: Negative for activity change, appetite change and fatigue.   HENT: Negative for hearing loss and postnasal drip.    Eyes: Negative for discharge and itching.   Respiratory: Negative for cough and shortness of breath.    Cardiovascular: Negative for chest pain and leg swelling.   Gastrointestinal: Negative for abdominal distention and abdominal pain.   Endocrine: Negative for cold intolerance and heat intolerance.   Genitourinary: Negative for difficulty urinating and flank pain.   Musculoskeletal: Negative for arthralgias and myalgias.   Skin: Negative for color change.   Neurological: Negative for dizziness and facial asymmetry.   Hematological: Negative for adenopathy.   Psychiatric/Behavioral: Negative for agitation and confusion.       Compared to one year ago, the patient feels her physical health is the same.  Compared to one year ago, the patient feels her mental health is the same.    Reviewed chart for potential of high risk medication in the elderly: yes  Reviewed chart for potential of harmful drug interactions in the elderly:yes    Objective         Vitals:    02/12/20 1407   BP: 106/63   Pulse: 76   Temp: 97.3 °F (36.3 °C)   SpO2: 96%   Weight: 65.3 kg (144 lb)   Height: 165.1 cm (65\")       Body mass index is 23.96 kg/m².  Discussed the patient's BMI with her. The BMI is in the acceptable range.    Physical Exam   Constitutional: She appears well-developed and well-nourished.   HENT:   Head: Normocephalic and atraumatic.   Right Ear: External ear normal.   Left Ear: External ear normal.   Nose: Nose normal.   Mouth/Throat: Oropharynx is clear and moist.   Eyes: Pupils are equal, round, and reactive to light. Conjunctivae and EOM are normal. Right eye exhibits no discharge. Left eye exhibits no discharge. No scleral icterus.   Neck: Normal range of motion. Neck " supple. No JVD present. No tracheal deviation present. No thyromegaly present.   Cardiovascular: Normal rate, regular rhythm, normal heart sounds and intact distal pulses. Exam reveals no gallop and no friction rub.   No murmur heard.  Pulmonary/Chest: Effort normal and breath sounds normal. No respiratory distress. She has no wheezes. She has no rales. She exhibits no tenderness.   Abdominal: Soft. Bowel sounds are normal. She exhibits no distension and no mass. There is no tenderness. There is no rebound and no guarding. No hernia.   Musculoskeletal: Normal range of motion. She exhibits no edema or tenderness.   Lymphadenopathy:     She has no cervical adenopathy.   Neurological: She displays normal reflexes. No cranial nerve deficit or sensory deficit. She exhibits normal muscle tone. Coordination normal.   Skin: Skin is warm and dry.   Psychiatric: She has a normal mood and affect. Her behavior is normal. Judgment and thought content normal.   Nursing note and vitals reviewed.            Assessment/Plan   Medicare Risks and Personalized Health Plan  CMS Preventative Services Quick Reference  Fall Risk    The above risks/problems have been discussed with the patient.  Pertinent information has been shared with the patient in the After Visit Summary.  Follow up plans and orders are seen below in the Assessment/Plan Section.    Diagnoses and all orders for this visit:    1. Primary insomnia (Primary)  -     zolpidem (AMBIEN) 10 MG tablet; 1/2 tablet at night prn insomnia  Dispense: 20 tablet; Refill: 3    2. Medicare annual wellness visit, subsequent    3. Mixed hyperlipidemia    4. Essential hypertension    5. Major depressive disorder in partial remission, unspecified whether recurrent (CMS/[facility])    Other orders  -     isosorbide mononitrate (IMDUR) 60 MG 24 hr tablet; Take 1 tablet by mouth Daily.  Dispense: 90 tablet; Refill: 3  -     cyanocobalamin 1000 MCG/ML injection; Inject 1 mL into the appropriate muscle  "as directed by prescriber Every 30 (Thirty) Days.  Dispense: 3 mL; Refill: 3  -     Syringe 22G X 1\" 3 ML misc; 1 mL Every 30 (Thirty) Days.  Dispense: 10 each; Refill: 2  -     mirtazapine (REMERON) 15 MG tablet; One at night.  Dispense: 30 tablet; Refill: 5    HTN controlled.  Continue meds.    LDL at goal.  contniue statin.    Follow Up:  Return in about 4 months (around 6/12/2020).     An After Visit Summary and PPPS were given to the patient.             "

## 2020-05-27 PROBLEM — M17.12 PRIMARY OSTEOARTHRITIS OF LEFT KNEE: Status: ACTIVE | Noted: 2020-01-01

## 2020-05-27 NOTE — PROGRESS NOTES
Chief Complaint   Patient presents with   • Knee Pain     fell several months ago   • Hypertension   • Heartburn     after eating       Subjective   Kanchan Angela is a 84 y.o. female.     History of Present Illness   C/o L knee pain for years.  Increased trouble.  Worse with getitng in bed.  Worse with bending.    F/U HTN.  No orthostasis.  Doing well with meds.   F/U Stan/depression.  I am more anxious with my  who has dementia.   C/o episodic chest pain.  At times after eating.  I cna get it while sitting.  No constipation.   Seeing Dr Rios 6/10/20.        The following portions of the patient's history were reviewed and updated as appropriate: allergies, current medications, past family history, past medical history, past social history, past surgical history and problem list.    Review of Systems   Constitutional: Negative for appetite change and fatigue.   HENT: Negative for nosebleeds and sore throat.    Eyes: Negative for blurred vision and visual disturbance.   Respiratory: Negative for shortness of breath and wheezing.    Cardiovascular: Negative for chest pain and leg swelling.   Gastrointestinal: Negative for abdominal distention and abdominal pain.   Endocrine: Negative for cold intolerance and polyuria.   Genitourinary: Negative for dysuria and hematuria.   Musculoskeletal: Positive for arthralgias. Negative for myalgias.   Skin: Negative for color change and rash.   Neurological: Negative for weakness and confusion.   Psychiatric/Behavioral: Negative for agitation and depressed mood.       Patient Active Problem List   Diagnosis   • Angina pectoris (CMS/[facility])   • CAD (coronary artery disease)   • Confusion   • DVT (deep venous thrombosis) (CMS/HCC)   • Fatigue   • GERD (gastroesophageal reflux disease)   • HTN (hypertension)   • Palpitations   • Insomnia   • SOB (shortness of breath) on exertion   • Spinal stenosis of lumbar region   • Vitamin B 12 deficiency   • Scalp laceration   • Eczema of  left external ear   • Allergic rhinitis   • Benign neoplasm of cerebral meninges (CMS/HCC)   • Peripheral vascular disease (CMS/HCC)   • Osteoarthritis   • Memory impairment   • Lumbosacral radiculopathy   • Hyperlipidemia   • Difficulty walking   • Post concussive syndrome   • Medicare annual wellness visit, subsequent   • Depression   • Primary osteoarthritis of left knee       Allergies   Allergen Reactions   • Ciprofloxacin Diarrhea   • Gabapentin Confusion   • Metronidazole Diarrhea         Current Outpatient Medications:   •  amLODIPine (NORVASC) 2.5 MG tablet, Take 1 tablet by mouth Daily., Disp: 90 tablet, Rfl: 3  •  aspirin 81 MG tablet, aspirin 81 mg tablet,delayed release  1 tablet by mouth daily, Disp: , Rfl:   •  atorvastatin (LIPITOR) 80 MG tablet, Take 1 tablet by mouth Daily., Disp: 90 tablet, Rfl: 3  •  BRILINTA 90 MG tablet tablet, Take 1 tablet by mouth 2 (Two) Times a Day. as directed, Disp: 180 tablet, Rfl: 1  •  carvedilol (COREG) 3.125 MG tablet, Take 1 tablet by mouth 2 (Two) Times a Day., Disp: 180 tablet, Rfl: 3  •  cyanocobalamin 1000 MCG/ML injection, Inject 1 mL into the appropriate muscle as directed by prescriber Every 30 (Thirty) Days., Disp: 3 mL, Rfl: 3  •  escitalopram (LEXAPRO) 10 MG tablet, 1.5 tablets a day, Disp: 135 tablet, Rfl: 2  •  isosorbide mononitrate (IMDUR) 60 MG 24 hr tablet, Take 1 tablet by mouth Daily., Disp: 90 tablet, Rfl: 3  •  losartan (COZAAR) 25 MG tablet, Take 1 tablet by mouth 2 (Two) Times a Day., Disp: 180 tablet, Rfl: 3  •  mirtazapine (REMERON) 15 MG tablet, One at night., Disp: 30 tablet, Rfl: 5  •  Multiple Vitamins-Minerals (PRESERVISION AREDS PO), Take  by mouth., Disp: , Rfl:   •  nitroglycerin (NITROSTAT) 0.4 MG SL tablet, Place 0.4 mg under the tongue Every 5 (Five) Minutes As Needed for Chest Pain. Take no more than 3 doses in 15 minutes., Disp: , Rfl:   •  nystatin (MYCOSTATIN) 709306 UNIT/GM ointment, nystatin 100,000 unit/gram topical  "ointment, Disp: , Rfl:   •  Syringe 22G X 1\" 3 ML misc, 1 mL Every 30 (Thirty) Days., Disp: 10 each, Rfl: 2  •  zolpidem (AMBIEN) 10 MG tablet, 1/2 tablet at night prn insomnia, Disp: 20 tablet, Rfl: 3  •  omeprazole (priLOSEC) 40 MG capsule, Take 1 capsule by mouth Daily., Disp: 30 capsule, Rfl: 4    Past Medical History:   Diagnosis Date   • Abnormal electrocardiogram    • Angina pectoris (CMS/[facility]) 8/1/2019   • CAD (coronary artery disease) 8/1/2019   • Chest pain    • Confusion 8/1/2019   • Cramping of feet    • DVT (deep venous thrombosis) (CMS/[facility]) 8/1/2019   • Dysuria    • Fatigue 8/1/2019   • GERD (gastroesophageal reflux disease) 8/1/2019   • High risk medication use    • HTN (hypertension)    • Immunization deficiency    • Insomnia 8/1/2019   • Knee pain    • Leg cramps    • Palpitations 8/1/2019   • Persistent insomnia    • Residual foreign body in soft tissue    • SOB (shortness of breath) on exertion    • Spinal stenosis of lumbar region 8/1/2019   • Vaginitis    • Vitamin B 12 deficiency 8/1/2019       Past Surgical History:   Procedure Laterality Date   • CATARACT EXTRACTION     • HYSTERECTOMY      Due to cancer   • NEUROPLASTY      Decompression Median Nerve at Carpal tunnel   • OTHER SURGICAL HISTORY      Rectocele Repair   • SHOULDER ROTATOR CUFF REPAIR     • SHOULDER SURGERY         Family History   Problem Relation Age of Onset   • Cardiomyopathy Mother    • Heart attack Mother    • Cardiomyopathy Father    • Heart attack Father    • Cardiomyopathy Son        Social History     Tobacco Use   • Smoking status: Never Smoker   • Smokeless tobacco: Never Used   Substance Use Topics   • Alcohol use: No     Frequency: Never            Objective     Vitals:    05/27/20 1522   BP: 142/86   Pulse: 76   Temp: 97.8 °F (36.6 °C)   SpO2: 97%     Body mass index is 24 kg/m².    Physical Exam   Constitutional: She appears well-developed and well-nourished.   HENT:   Head: Normocephalic and atraumatic. "   Mouth/Throat: Oropharynx is clear and moist.   Eyes: Pupils are equal, round, and reactive to light. No scleral icterus.   Neck: No thyromegaly present.   Cardiovascular: Normal rate and regular rhythm. Exam reveals no gallop and no friction rub.   No murmur heard.  Pulmonary/Chest: Effort normal. No respiratory distress. She has no wheezes. She has no rales. She exhibits no tenderness.   Abdominal: Soft. Bowel sounds are normal. She exhibits no distension. There is no tenderness.   Musculoskeletal: Normal range of motion. She exhibits no edema or deformity.   Lymphadenopathy:     She has no cervical adenopathy.   Neurological: No cranial nerve deficit. She exhibits normal muscle tone.   Skin: Skin is warm and dry. No rash noted. She is not diaphoretic.   Vitals reviewed.      Lab Results   Component Value Date    BUN 14 08/01/2019    CREATININE 0.99 08/01/2019    EGFRIFNONA 54 (L) 08/01/2019    EGFRIFAFRI 65 08/01/2019    BCR 14.1 08/01/2019    K 4.9 08/01/2019    CO2 27.9 08/01/2019    CALCIUM 10.0 08/01/2019    PROTENTOTREF 7.4 08/01/2019    ALBUMIN 4.80 08/01/2019    LABIL2 1.8 08/01/2019    AST 26 08/01/2019    ALT 18 08/01/2019       WBC   Date Value Ref Range Status   08/01/2019 5.20 3.40 - 10.80 10*3/mm3 Final     RBC   Date Value Ref Range Status   08/01/2019 4.24 3.77 - 5.28 10*6/mm3 Final     Hemoglobin   Date Value Ref Range Status   08/01/2019 12.7 12.0 - 15.9 g/dL Final     Hematocrit   Date Value Ref Range Status   08/01/2019 40.3 34.0 - 46.6 % Final     MCV   Date Value Ref Range Status   08/01/2019 95.0 79.0 - 97.0 fL Final     MCH   Date Value Ref Range Status   08/01/2019 30.0 26.6 - 33.0 pg Final     MCHC   Date Value Ref Range Status   08/01/2019 31.5 31.5 - 35.7 g/dL Final     RDW   Date Value Ref Range Status   08/01/2019 13.4 12.3 - 15.4 % Final     Platelets   Date Value Ref Range Status   08/01/2019 217 140 - 450 10*3/mm3 Final     Neutrophil Rel %   Date Value Ref Range Status    08/01/2019 64.4 42.7 - 76.0 % Final     Lymphocyte Rel %   Date Value Ref Range Status   08/01/2019 21.0 19.6 - 45.3 % Final     Monocyte Rel %   Date Value Ref Range Status   08/01/2019 11.7 5.0 - 12.0 % Final     Eosinophil Rel %   Date Value Ref Range Status   08/01/2019 2.1 0.3 - 6.2 % Final     Basophil Rel %   Date Value Ref Range Status   08/01/2019 0.6 0.0 - 1.5 % Final     Neutrophils Absolute   Date Value Ref Range Status   08/01/2019 3.35 1.70 - 7.00 10*3/mm3 Final     Lymphocytes Absolute   Date Value Ref Range Status   08/01/2019 1.09 0.70 - 3.10 10*3/mm3 Final     Monocytes Absolute   Date Value Ref Range Status   08/01/2019 0.61 0.10 - 0.90 10*3/mm3 Final     Eosinophils Absolute   Date Value Ref Range Status   08/01/2019 0.11 0.00 - 0.40 10*3/mm3 Final     Basophils Absolute   Date Value Ref Range Status   08/01/2019 0.03 0.00 - 0.20 10*3/mm3 Final     nRBC   Date Value Ref Range Status   08/01/2019 0.0 0.0 - 0.2 /100 WBC Final       No results found for: HGBA1C    Lab Results   Component Value Date    [id number] >2000 (H) 08/01/2019       TSH   Date Value Ref Range Status   08/01/2019 1.220 0.270 - 4.200 mIU/mL Final       No results found for: CHOL  Lab Results   Component Value Date    TRIG 141 08/01/2019     Lab Results   Component Value Date    HDL 56 08/01/2019     Lab Results   Component Value Date     (H) 08/01/2019     Lab Results   Component Value Date    VLDL 28.2 08/01/2019     No results found for: LDLHDL      Procedures    Assessment/Plan   Problems Addressed this Visit        Cardiovascular and Mediastinum    Angina pectoris (CMS/HCC)    Relevant Orders    CBC & Differential    Lipid Panel With / Chol / HDL Ratio    HTN (hypertension)    Relevant Orders    Comprehensive Metabolic Panel    Lipid Panel With / Chol / HDL Ratio       Musculoskeletal and Integument    Primary osteoarthritis of left knee       Other    Depression - Primary    Relevant Medications    escitalopram  (LEXAPRO) 10 MG tablet      Other Visit Diagnoses     Chest pain, unspecified type        Relevant Orders    Ambulatory Referral to Orthopedic Surgery      HTN controlled.  Continue meds.    To ortho.  To Dr Rios(cards).  Trial of omeprazole to see if improves chest pain.    Orders Placed This Encounter   Procedures   • Comprehensive Metabolic Panel   • Lipid Panel With / Chol / HDL Ratio   • Ambulatory Referral to Orthopedic Surgery     Referral Priority:   Routine     Referral Type:   Consultation     Referral Reason:   Specialty Services Required     Requested Specialty:   Orthopedic Surgery     Number of Visits Requested:   1   • CBC & Differential     Order Specific Question:   Manual Differential     Answer:   No       Current Outpatient Medications   Medication Sig Dispense Refill   • amLODIPine (NORVASC) 2.5 MG tablet Take 1 tablet by mouth Daily. 90 tablet 3   • aspirin 81 MG tablet aspirin 81 mg tablet,delayed release   1 tablet by mouth daily     • atorvastatin (LIPITOR) 80 MG tablet Take 1 tablet by mouth Daily. 90 tablet 3   • BRILINTA 90 MG tablet tablet Take 1 tablet by mouth 2 (Two) Times a Day. as directed 180 tablet 1   • carvedilol (COREG) 3.125 MG tablet Take 1 tablet by mouth 2 (Two) Times a Day. 180 tablet 3   • cyanocobalamin 1000 MCG/ML injection Inject 1 mL into the appropriate muscle as directed by prescriber Every 30 (Thirty) Days. 3 mL 3   • escitalopram (LEXAPRO) 10 MG tablet 1.5 tablets a day 135 tablet 2   • isosorbide mononitrate (IMDUR) 60 MG 24 hr tablet Take 1 tablet by mouth Daily. 90 tablet 3   • losartan (COZAAR) 25 MG tablet Take 1 tablet by mouth 2 (Two) Times a Day. 180 tablet 3   • mirtazapine (REMERON) 15 MG tablet One at night. 30 tablet 5   • Multiple Vitamins-Minerals (PRESERVISION AREDS PO) Take  by mouth.     • nitroglycerin (NITROSTAT) 0.4 MG SL tablet Place 0.4 mg under the tongue Every 5 (Five) Minutes As Needed for Chest Pain. Take no more than 3 doses in 15  "minutes.     • nystatin (MYCOSTATIN) 066935 UNIT/GM ointment nystatin 100,000 unit/gram topical ointment     • Syringe 22G X 1\" 3 ML misc 1 mL Every 30 (Thirty) Days. 10 each 2   • zolpidem (AMBIEN) 10 MG tablet 1/2 tablet at night prn insomnia 20 tablet 3   • omeprazole (priLOSEC) 40 MG capsule Take 1 capsule by mouth Daily. 30 capsule 4     No current facility-administered medications for this visit.        Kanchan Angela had no medications administered during this visit.    Return in about 2 months (around 7/27/2020).    There are no Patient Instructions on file for this visit.       "

## 2020-07-30 NOTE — PROGRESS NOTES
"F/u depression    Subjective   Kanchan Angela is a 84 y.o. female.     History of Present Illness   Telephone visit.  Consent obtained.  12 minute visit.    F/U depression.  Doing well with meds.  It has helped me.    F/U insomnia.  Doing well with mirtazapine 15 mg at night.  Only rarely takes a \"pinch\" off ambien.    F/U HTN.  No orthostasis.    The following portions of the patient's history were reviewed and updated as appropriate: allergies, current medications, past family history, past medical history, past social history, past surgical history and problem list.    Review of Systems   Constitutional: Negative for appetite change and fatigue.   HENT: Negative for nosebleeds and sore throat.    Eyes: Negative for blurred vision and visual disturbance.   Respiratory: Negative for shortness of breath and wheezing.    Cardiovascular: Negative for chest pain and leg swelling.   Gastrointestinal: Negative for abdominal distention and abdominal pain.   Endocrine: Negative for cold intolerance and polyuria.   Genitourinary: Negative for dysuria and hematuria.   Musculoskeletal: Negative for arthralgias and myalgias.   Skin: Negative for color change and rash.   Neurological: Negative for weakness and confusion.   Psychiatric/Behavioral: Negative for agitation and depressed mood.       Patient Active Problem List   Diagnosis   • Angina pectoris (CMS/HCC)   • CAD (coronary artery disease)   • Confusion   • DVT (deep venous thrombosis) (CMS/HCC)   • Fatigue   • GERD (gastroesophageal reflux disease)   • HTN (hypertension)   • Palpitations   • Insomnia   • SOB (shortness of breath) on exertion   • Spinal stenosis of lumbar region   • Vitamin B 12 deficiency   • Scalp laceration   • Eczema of left external ear   • Allergic rhinitis   • Benign neoplasm of cerebral meninges (CMS/HCC)   • Peripheral vascular disease (CMS/HCC)   • Osteoarthritis   • Memory impairment   • Lumbosacral radiculopathy   • Hyperlipidemia   • " "Difficulty walking   • Post concussive syndrome   • Medicare annual wellness visit, subsequent   • Depression   • Primary osteoarthritis of left knee       Allergies   Allergen Reactions   • Ciprofloxacin Diarrhea   • Gabapentin Confusion   • Metronidazole Diarrhea         Current Outpatient Medications:   •  amLODIPine (NORVASC) 2.5 MG tablet, Take 1 tablet by mouth Daily., Disp: 90 tablet, Rfl: 3  •  aspirin 81 MG tablet, aspirin 81 mg tablet,delayed release  1 tablet by mouth daily, Disp: , Rfl:   •  atorvastatin (LIPITOR) 80 MG tablet, Take 1 tablet by mouth Daily., Disp: 90 tablet, Rfl: 3  •  BRILINTA 90 MG tablet tablet, TAKE 1 TABLET BY MOUTH TWICE DAILY AS DIRECTED, Disp: 180 tablet, Rfl: 0  •  carvedilol (COREG) 3.125 MG tablet, Take 1 tablet by mouth 2 (Two) Times a Day., Disp: 180 tablet, Rfl: 3  •  cyanocobalamin 1000 MCG/ML injection, Inject 1 mL into the appropriate muscle as directed by prescriber Every 30 (Thirty) Days., Disp: 3 mL, Rfl: 3  •  escitalopram (LEXAPRO) 10 MG tablet, 1.5 tablets a day, Disp: 135 tablet, Rfl: 2  •  isosorbide mononitrate (IMDUR) 60 MG 24 hr tablet, Take 1 tablet by mouth Daily., Disp: 90 tablet, Rfl: 3  •  losartan (COZAAR) 25 MG tablet, Take 1 tablet by mouth 2 (Two) Times a Day., Disp: 180 tablet, Rfl: 3  •  mirtazapine (REMERON) 15 MG tablet, One at night., Disp: 30 tablet, Rfl: 5  •  Multiple Vitamins-Minerals (PRESERVISION AREDS PO), Take  by mouth., Disp: , Rfl:   •  nitroglycerin (NITROSTAT) 0.4 MG SL tablet, Place 0.4 mg under the tongue Every 5 (Five) Minutes As Needed for Chest Pain. Take no more than 3 doses in 15 minutes., Disp: , Rfl:   •  nystatin (MYCOSTATIN) 905467 UNIT/GM ointment, nystatin 100,000 unit/gram topical ointment, Disp: , Rfl:   •  omeprazole (priLOSEC) 40 MG capsule, Take 1 capsule by mouth Daily., Disp: 30 capsule, Rfl: 4  •  Syringe 22G X 1\" 3 ML misc, 1 mL Every 30 (Thirty) Days., Disp: 10 each, Rfl: 2  •  zolpidem (AMBIEN) 10 MG tablet, " 1/2 tablet at night prn insomnia, Disp: 20 tablet, Rfl: 3    Past Medical History:   Diagnosis Date   • Abnormal electrocardiogram    • Angina pectoris (CMS/[facility]) 8/1/2019   • CAD (coronary artery disease) 8/1/2019   • Chest pain    • Confusion 8/1/2019   • Cramping of feet    • DVT (deep venous thrombosis) (CMS/[facility]) 8/1/2019   • Dysuria    • Fatigue 8/1/2019   • GERD (gastroesophageal reflux disease) 8/1/2019   • High risk medication use    • HTN (hypertension)    • Immunization deficiency    • Insomnia 8/1/2019   • Knee pain    • Leg cramps    • Palpitations 8/1/2019   • Persistent insomnia    • Residual foreign body in soft tissue    • SOB (shortness of breath) on exertion    • Spinal stenosis of lumbar region 8/1/2019   • Vaginitis    • Vitamin B 12 deficiency 8/1/2019       Past Surgical History:   Procedure Laterality Date   • CATARACT EXTRACTION     • HYSTERECTOMY      Due to cancer   • NEUROPLASTY      Decompression Median Nerve at Carpal tunnel   • OTHER SURGICAL HISTORY      Rectocele Repair   • SHOULDER ROTATOR CUFF REPAIR     • SHOULDER SURGERY         Family History   Problem Relation Age of Onset   • Cardiomyopathy Mother    • Heart attack Mother    • Cardiomyopathy Father    • Heart attack Father    • Cardiomyopathy Son        Social History     Tobacco Use   • Smoking status: Never Smoker   • Smokeless tobacco: Never Used   Substance Use Topics   • Alcohol use: No     Frequency: Never            Objective     Vitals:    07/30/20 0947   BP: 100/70   Resp: 12     Body mass index is 23.96 kg/m².    Physical Exam   Constitutional: She appears well-developed and well-nourished.   HENT:   Head: Normocephalic and atraumatic.   Mouth/Throat: Oropharynx is clear and moist.   Eyes: Pupils are equal, round, and reactive to light. No scleral icterus.   Neck: No thyromegaly present.   Cardiovascular: Normal rate and regular rhythm. Exam reveals no gallop and no friction rub.   No murmur heard.  Pulmonary/Chest:  Effort normal. No respiratory distress. She has no wheezes. She has no rales. She exhibits no tenderness.   Abdominal: Soft. Bowel sounds are normal. She exhibits no distension. There is no tenderness.   Musculoskeletal: Normal range of motion. She exhibits no edema or deformity.   Lymphadenopathy:     She has no cervical adenopathy.   Neurological: No cranial nerve deficit. She exhibits normal muscle tone.   Skin: Skin is warm and dry. No rash noted. She is not diaphoretic.   Vitals reviewed.      Lab Results   Component Value Date    BUN 16 06/08/2020    CREATININE 1.18 (H) 06/08/2020    EGFRIFNONA 43 (L) 06/08/2020    EGFRIFAFRI 49 (L) 06/08/2020    BCR 14 06/08/2020    K 4.1 06/08/2020    CO2 22 06/08/2020    CALCIUM 9.0 06/08/2020    PROTENTOTREF 6.2 06/08/2020    ALBUMIN 4.3 06/08/2020    LABIL2 2.3 (H) 06/08/2020    AST 22 06/08/2020    ALT 18 06/08/2020       WBC   Date Value Ref Range Status   06/08/2020 5.3 3.4 - 10.8 x10E3/uL Final     RBC   Date Value Ref Range Status   06/08/2020 3.78 3.77 - 5.28 x10E6/uL Final     Hemoglobin   Date Value Ref Range Status   06/08/2020 11.5 11.1 - 15.9 g/dL Final     Hematocrit   Date Value Ref Range Status   06/08/2020 34.9 34.0 - 46.6 % Final     MCV   Date Value Ref Range Status   06/08/2020 92 79 - 97 fL Final     MCH   Date Value Ref Range Status   06/08/2020 30.4 26.6 - 33.0 pg Final     MCHC   Date Value Ref Range Status   06/08/2020 33.0 31.5 - 35.7 g/dL Final     RDW   Date Value Ref Range Status   06/08/2020 13.4 11.7 - 15.4 % Final     Platelets   Date Value Ref Range Status   06/08/2020 184 150 - 450 x10E3/uL Final     Neutrophil Rel %   Date Value Ref Range Status   06/08/2020 67 Not Estab. % Final     Lymphocyte Rel %   Date Value Ref Range Status   06/08/2020 20 Not Estab. % Final     Monocyte Rel %   Date Value Ref Range Status   06/08/2020 11 Not Estab. % Final     Eosinophil Rel %   Date Value Ref Range Status   06/08/2020 2 Not Estab. % Final      Basophil Rel %   Date Value Ref Range Status   06/08/2020 0 Not Estab. % Final     Neutrophils Absolute   Date Value Ref Range Status   06/08/2020 3.5 1.4 - 7.0 x10E3/uL Final     Lymphocytes Absolute   Date Value Ref Range Status   06/08/2020 1.1 0.7 - 3.1 x10E3/uL Final     Monocytes Absolute   Date Value Ref Range Status   06/08/2020 0.6 0.1 - 0.9 x10E3/uL Final     Eosinophils Absolute   Date Value Ref Range Status   06/08/2020 0.1 0.0 - 0.4 x10E3/uL Final     Basophils Absolute   Date Value Ref Range Status   06/08/2020 0.0 0.0 - 0.2 x10E3/uL Final     nRBC   Date Value Ref Range Status   08/01/2019 0.0 0.0 - 0.2 /100 WBC Final       No results found for: HGBA1C    Lab Results   Component Value Date    ESRQBQHF69 >2000 (H) 08/01/2019       TSH   Date Value Ref Range Status   08/01/2019 1.220 0.270 - 4.200 mIU/mL Final       No results found for: CHOL  Lab Results   Component Value Date    TRIG 294 (H) 06/08/2020     Lab Results   Component Value Date    HDL 37 (L) 06/08/2020     Lab Results   Component Value Date    LDL 41 06/08/2020     Lab Results   Component Value Date    VLDL 59 (H) 06/08/2020     No results found for: LDLHDL      Procedures    Assessment/Plan   Problems Addressed this Visit        Cardiovascular and Mediastinum    HTN (hypertension) - Primary       Other    Insomnia    Depression        Continue lexapro.   Continue BP meds.    Rx for mirtazapine sent.    No orders of the defined types were placed in this encounter.      Current Outpatient Medications   Medication Sig Dispense Refill   • amLODIPine (NORVASC) 2.5 MG tablet Take 1 tablet by mouth Daily. 90 tablet 3   • aspirin 81 MG tablet aspirin 81 mg tablet,delayed release   1 tablet by mouth daily     • atorvastatin (LIPITOR) 80 MG tablet Take 1 tablet by mouth Daily. 90 tablet 3   • BRILINTA 90 MG tablet tablet TAKE 1 TABLET BY MOUTH TWICE DAILY AS DIRECTED 180 tablet 0   • carvedilol (COREG) 3.125 MG tablet Take 1 tablet by mouth 2  "(Two) Times a Day. 180 tablet 3   • cyanocobalamin 1000 MCG/ML injection Inject 1 mL into the appropriate muscle as directed by prescriber Every 30 (Thirty) Days. 3 mL 3   • escitalopram (LEXAPRO) 10 MG tablet 1.5 tablets a day 135 tablet 2   • isosorbide mononitrate (IMDUR) 60 MG 24 hr tablet Take 1 tablet by mouth Daily. 90 tablet 3   • losartan (COZAAR) 25 MG tablet Take 1 tablet by mouth 2 (Two) Times a Day. 180 tablet 3   • mirtazapine (REMERON) 15 MG tablet One at night. 30 tablet 5   • Multiple Vitamins-Minerals (PRESERVISION AREDS PO) Take  by mouth.     • nitroglycerin (NITROSTAT) 0.4 MG SL tablet Place 0.4 mg under the tongue Every 5 (Five) Minutes As Needed for Chest Pain. Take no more than 3 doses in 15 minutes.     • nystatin (MYCOSTATIN) 164526 UNIT/GM ointment nystatin 100,000 unit/gram topical ointment     • omeprazole (priLOSEC) 40 MG capsule Take 1 capsule by mouth Daily. 30 capsule 4   • Syringe 22G X 1\" 3 ML misc 1 mL Every 30 (Thirty) Days. 10 each 2   • zolpidem (AMBIEN) 10 MG tablet 1/2 tablet at night prn insomnia 20 tablet 3     No current facility-administered medications for this visit.        Kanchan GARCIAKody Angela had no medications administered during this visit.    Return in about 4 months (around 11/30/2020).    There are no Patient Instructions on file for this visit.       "

## 2020-07-31 NOTE — TELEPHONE ENCOUNTER
Staring llast night she started with dizzy spells & can't walk mor than a foot with loosing her balance, if sitting down & stands up then she feels like she'll fall. Son is thinking Urgent Care or ER but wants to confirm with office first

## 2020-07-31 NOTE — TELEPHONE ENCOUNTER
Spoke with pt son and advised him to take her to ER to be evaluated. He agreed and will call and let us know pt status

## 2020-08-26 ENCOUNTER — TELEPHONE (OUTPATIENT)
Dept: FAMILY MEDICINE CLINIC | Facility: CLINIC | Age: 85
End: 2020-08-26

## 2025-02-07 NOTE — TELEPHONE ENCOUNTER
Patient was seen on 2-12-20. Needs new rx for Losartan sent to [facility]. She is out   caleb